# Patient Record
Sex: FEMALE | Race: WHITE | Employment: UNEMPLOYED | URBAN - METROPOLITAN AREA
[De-identification: names, ages, dates, MRNs, and addresses within clinical notes are randomized per-mention and may not be internally consistent; named-entity substitution may affect disease eponyms.]

---

## 2024-01-31 ENCOUNTER — OFFICE VISIT (OUTPATIENT)
Dept: OBGYN CLINIC | Facility: CLINIC | Age: 44
End: 2024-01-31

## 2024-01-31 VITALS
BODY MASS INDEX: 24.29 KG/M2 | HEART RATE: 96 BPM | DIASTOLIC BLOOD PRESSURE: 75 MMHG | HEIGHT: 62 IN | SYSTOLIC BLOOD PRESSURE: 133 MMHG | WEIGHT: 132 LBS

## 2024-01-31 DIAGNOSIS — Z12.31 ENCOUNTER FOR SCREENING MAMMOGRAM FOR MALIGNANT NEOPLASM OF BREAST: ICD-10-CM

## 2024-01-31 DIAGNOSIS — Z01.419 ENCOUNTER FOR ANNUAL ROUTINE GYNECOLOGICAL EXAMINATION: Primary | ICD-10-CM

## 2024-01-31 PROCEDURE — G0145 SCR C/V CYTO,THINLAYER,RESCR: HCPCS | Performed by: NURSE PRACTITIONER

## 2024-01-31 PROCEDURE — G0476 HPV COMBO ASSAY CA SCREEN: HCPCS | Performed by: NURSE PRACTITIONER

## 2024-01-31 PROCEDURE — S0610 ANNUAL GYNECOLOGICAL EXAMINA: HCPCS | Performed by: NURSE PRACTITIONER

## 2024-01-31 NOTE — PATIENT INSTRUCTIONS
PAP results can take up to 2 weeks  Schedule mammogram  You will be call when IUD is available for insertion  Call with needs or concerns  Return in 1 year for annual GYN exam

## 2024-01-31 NOTE — PROGRESS NOTES
Annual Exam    Assessment   1. Encounter for annual routine gynecological examination  Liquid-based pap, screening      2. Encounter for screening mammogram for malignant neoplasm of breast  Mammo screening bilateral w 3d & cad        well woman       Plan       All questions answered.  Breast self exam technique reviewed and patient encouraged to perform self-exam monthly.  Contraception: IUD.  Discussed healthy lifestyle modifications.  Follow up in 1 year.  Mammogram.  Thin prep Pap smear.     Patient Instructions   PAP results can take up to 2 weeks  Schedule mammogram  You will be call when IUD is available for insertion  Call with needs or concerns  Return in 1 year for annual GYN exam    Subjective      Mallory Freitas is a 43 y.o.  female who presents for annual well woman exam. Periods are regular every 28-30 days, lasting 5 days. No intermenstrual bleeding, spotting, or discharge.  1 partner 21 years, denies domestic violence   Last PAP > 3 years ago in [place], pt states all PAP's have been WNL    Depression Screening Follow-up Plan: Patient's depression screening was negative with a PHQ-2 score of 1. Their PHQ-9 score was 8. Clinically patient does not have depression. No treatment is required.      Current contraception: IUD, Paragard since 2013, pt would like a removal and reinsertion, new Paragard was ordered  History of abnormal Pap smear: no  Family history of uterine or ovarian cancer: no  Regular self breast exam: yes  History of abnormal mammogram: N/A  Family history of breast cancer: no  History of abnormal lipids: unknown    Menstrual History:  OB History          2    Para   2    Term   2            AB        Living   2         SAB        IAB        Ectopic        Multiple        Live Births   2                Menarche age: 15  Patient's last menstrual period was 2024 (exact date).       The following portions of the patient's history were reviewed and updated as  "appropriate: allergies, current medications, past family history, past medical history, past social history, past surgical history and problem list.    Review of Systems  Pertinent items are noted in HPI.      Objective      /75   Pulse 96   Ht 5' 2\" (1.575 m)   Wt 59.9 kg (132 lb)   LMP 01/07/2024 (Exact Date)   BMI 24.14 kg/m²     General: alert and oriented, in no acute distress, alert, appears stated age, and cooperative   Heart: NSR   Lungs: clear to auscultation bilaterally, WNL respiratory effort, negative coughj or SOB   Thyroid: Negative masses palpable   Abdomen: soft, non-tender, without masses or organomegaly palpable   Vulva: normal   Vagina: normal mucosa   Cervix: no bleeding following Pap, no cervical motion tenderness, no lesions, and IUD string noted at cervical os   Uterus: normal size, non-tender, normal shape and consistency   Adnexa: normal adnexa   Urethra: normal   Breasts: NT,negative masses palpable, negative discharge, or dimpling, bilateral breast implants             "

## 2024-02-01 LAB
HPV HR 12 DNA CVX QL NAA+PROBE: NEGATIVE
HPV16 DNA CVX QL NAA+PROBE: NEGATIVE
HPV18 DNA CVX QL NAA+PROBE: NEGATIVE

## 2024-02-05 LAB
LAB AP GYN PRIMARY INTERPRETATION: NORMAL
Lab: NORMAL

## 2024-02-22 ENCOUNTER — TELEPHONE (OUTPATIENT)
Dept: OBGYN CLINIC | Facility: CLINIC | Age: 44
End: 2024-02-22

## 2024-03-16 ENCOUNTER — TELEPHONE (OUTPATIENT)
Dept: OTHER | Facility: OTHER | Age: 44
End: 2024-03-16

## 2024-03-16 NOTE — TELEPHONE ENCOUNTER
Patient is awaiting medication that was supposed to be sent to pharmacy prior to her IUD insertion. No meds were sent yet, she is requesting a call back for instructions on her to take meds as well.

## 2024-03-18 ENCOUNTER — TELEPHONE (OUTPATIENT)
Dept: OBGYN CLINIC | Facility: CLINIC | Age: 44
End: 2024-03-18

## 2024-03-18 DIAGNOSIS — Z30.430 ENCOUNTER FOR IUD INSERTION: Primary | ICD-10-CM

## 2024-03-18 RX ORDER — MISOPROSTOL 200 UG/1
TABLET ORAL
Qty: 2 TABLET | Refills: 0 | Status: SHIPPED | OUTPATIENT
Start: 2024-03-18

## 2024-03-18 RX ORDER — MISOPROSTOL 200 UG/1
200 TABLET ORAL 4 TIMES DAILY
Qty: 2 TABLET | Refills: 0 | Status: SHIPPED | OUTPATIENT
Start: 2024-03-18 | End: 2024-03-18 | Stop reason: CLARIF

## 2024-03-18 NOTE — TELEPHONE ENCOUNTER
PT is calling again awaiting the medication.  Please call PT to verify the script was sent to the pharmacy.  [phone]

## 2024-03-19 ENCOUNTER — PROCEDURE VISIT (OUTPATIENT)
Dept: OBGYN CLINIC | Facility: CLINIC | Age: 44
End: 2024-03-19

## 2024-03-19 VITALS
BODY MASS INDEX: 24.55 KG/M2 | DIASTOLIC BLOOD PRESSURE: 83 MMHG | HEIGHT: 62 IN | WEIGHT: 133.4 LBS | SYSTOLIC BLOOD PRESSURE: 140 MMHG | HEART RATE: 105 BPM

## 2024-03-19 DIAGNOSIS — Z30.430 ENCOUNTER FOR IUD INSERTION: Primary | ICD-10-CM

## 2024-03-19 LAB — SL AMB POCT URINE HCG: NEGATIVE

## 2024-03-19 RX ORDER — COPPER 313.4 MG/1
1 INTRAUTERINE DEVICE INTRAUTERINE
OUTPATIENT
Start: 2024-03-19

## 2024-03-19 RX ADMIN — COPPER 1 INTRA UTERINE DEVICE: 313.4 INTRAUTERINE DEVICE INTRAUTERINE at 11:27

## 2024-03-19 NOTE — PROGRESS NOTES
Iud removal    Date/Time: 3/19/2024 9:00 AM    Performed by: Sierra Mcbride MD  Authorized by: Sierra Mcbride MD  Universal Protocol:  Procedure performed by: (Seth Hernandez MD)  Consent: Verbal consent obtained. Written consent obtained.  Consent given by: patient  Patient understanding: patient states understanding of the procedure being performed  Patient consent: the patient's understanding of the procedure matches consent given    Procedure:     Other reason for removal:  Removal indicated 10 years after placement  Comments:      Speculum was inserted in the vagina. Betadine was applied to the cervix. Cytobrush was inserted through cervical os, with confirmation that cervical os was dilated adequately for Paragard removal. Paraguard strings were grasped with ringed forceps and placed on gentle traction for removal. The device was inspected after removal, and it was noted that one of the arms of the Paragard was missing.    Counseling regarding this complication may be found in the visit note.

## 2024-03-19 NOTE — PROGRESS NOTES
Iud insertions    Date/Time: 3/19/2024 9:00 AM    Performed by: Sierra Mcbride MD  Authorized by: Sierra Mcbride MD    Other Assisting Provider: Yes (comment) (Seth Hernandez MD)    Verbal consent obtained?: Yes    Consent given by:  Patient  Patient states understanding of procedure being performed: Yes    Patient's understanding of procedure matches consent: Yes    Procedure:     Pelvic exam performed: yes      Negative urine pregnancy test: yes      Cervix cleaned and prepped: yes      Speculum placed in vagina: yes      Tenaculum applied to cervix: yes      Uterus sounded: yes      Uterus sound depth (cm):  8    IUD inserted with no complications: no      IUD type:  ParaGard    Strings trimmed: yes    Post-procedure:     Patient tolerated procedure well: no    Comments:      After Paragard removal as documented in previous procedure note, a tenaculum was placed on the anterior lip of the cervix. The uterus was sounded to 8cm. The device, insertion tube and stabilizing adrian were assembled as per  instructions. The insertion tube was placed through the cervix and advanced until the flange met the cervix. The insertion tube was withdrawn to the loop of the stabilizing adrian while holding the adrian completely stationary, releasing the IUD arms and deploying the IUD. At this point, the patient stated that she could no longer tolerate the procedure. The insertion tube and stabilizing adrian were immediately removed from the vagina. This occurred before the insertion tube was re-advanced to the cervix; because this step was not completed, it is uncertain if the IUD was appropriately placed at the fundus. The counseling that occurred at this time is described thoroughly in the office visit note.The IUD strings were trimmed to 2cm and the speculum was removed from the vagina.

## 2024-03-19 NOTE — PROGRESS NOTES
OB/GYN VISIT  Mallory Freitas  3/19/2024  4:30 PM    Subjective:     Mallory Freitas is a 43 y.o.  female who presents for removal and insertion of paragard IUD. Consent was signed with the patient for the previously mentioned procedure with risks discussed including bleeding, infection and pain.     After the removal of the 1st Paragard IUD, it was noted that one of the arms had been detached during extraction of the IUD, and was likely still within the uterus. It was discussed with the patient using  Hue Kellogg that the IUD was only partially extracted, with the remaining arm likely embedded in the uterine wall or floating freely within the uterus. It was explained that this is not a contraindication to proceeding with placement of a new IUD at this time. Alternatively, we could stop the procedure at this time, and if she is concerned about the missing arm of the IUD, we could proceed with an examine under anesthesia and hysteroscopy to search for that piece of the IUD. However, there is a risk that the procedure would be futile, as the piece is so small that it might not be possible to find it on hysteroscopy, or it may be expulsed on its own from the uterus if it is not embedded. After this discussion, the patient preferred to proceed with new Paragard placement.    During the IUD placement, the patient had  significant difficulty tolerating the pain of the procedure and requested to stop IUD placement mid-procedure. As discussed in the procedure note, this occurred after the insertion tube was pulled back to release the arms of the IUD and deploy the device, but before the insertion tube was advanced again to ensure that the device is appropriately placed at the fundus.  It was discussed that owing to this timing, though the IUD was placed, it is possibly malpositioned. Given that the IUD may be appropriately placed, and the possibility of causing further pain by removing the newly placed  "Paragard, the patient agreed to the plan to undergo evaluation of IUD placement with TVUS. We discussed that if the IUD is malpositioned on imaging and causing her pain, we can consider exam under anesthesia, removal of Paragard, and placement of new Paragard, given the pain that this placement caused her. The patient was in agreement with this plan. I expressed by sorrow and condolences multiple times, given the complications that occurred with both the removal and placement of Paragard. The patient was appreciate of the apologies and explanation using the .      No past medical history on file.  Past Surgical History:   Procedure Laterality Date    AUGMENTATION BREAST Bilateral      SECTION         Objective:    Vitals: Blood pressure 140/83, pulse 105, height 5' 2\" (1.575 m), weight 60.5 kg (133 lb 6.4 oz), last menstrual period 2024.Body mass index is 24.4 kg/m².      Physical Exam:  GEN: The patient was alert and oriented x3, pleasant well-appearing female in no acute distress.   CV:  Regular rate   RESP:  Unlabored breathing  GI:  Soft, nontender, non-distended  MSK: bilateral lower extremities are nontender, no edema  : Normal appearing external female genitalia, normal appearing urethral meatus. On sterile speculum exam,  normal appearing vaginal epithelium, no vaginal discharge, no bleeding, grossly normal appearing cervix.       Assessment/Plan:  Mallory Freitas is a 42 y/o female presenting for removal and insertion of IUD, with significant pain with both removal and insertion. There were complications with both removal and insertion of the IUD as stated in detail in the procedure note and subjective above. Given retention of the arm of the IUD that was removed, in addition to possible malpositioning of the new IUD, transvaginal ultrasound was ordered for evaluation.   Problem List Items Addressed This Visit    None  Visit Diagnoses       Encounter for IUD insertion    -  Primary "    Relevant Medications    intrauterine copper (PARAGARD) IUD    Other Relevant Orders    US pelvis complete w transvaginal    POCT urine HCG (Completed)            D/w Dr. Mary Mcbride MD  3/19/2024  4:30 PM    Please note, all notes within the [facility] are written in medical terminology for other doctors to read. If you have a question about something you see in your chart, please don't hesitate to ask about it at your next appointment. All test results are immediately available through [facility] as well, and I will review results at my earliest opportunity and contact you with the appropriate urgency.

## 2024-03-25 ENCOUNTER — HOSPITAL ENCOUNTER (OUTPATIENT)
Dept: ULTRASOUND IMAGING | Facility: HOSPITAL | Age: 44
Discharge: HOME | End: 2024-03-25
Payer: COMMERCIAL

## 2024-03-25 ENCOUNTER — TELEPHONE (OUTPATIENT)
Dept: OBGYN CLINIC | Facility: CLINIC | Age: 44
End: 2024-03-25

## 2024-03-25 DIAGNOSIS — Z30.430 ENCOUNTER FOR IUD INSERTION: ICD-10-CM

## 2024-03-25 PROCEDURE — 76856 US EXAM PELVIC COMPLETE: CPT

## 2024-03-25 PROCEDURE — 76830 TRANSVAGINAL US NON-OB: CPT

## 2024-03-25 NOTE — TELEPHONE ENCOUNTER
Called patient with  [phone] to discuss the result of her transvaginal ultrasound, which demonstrates a malpositioned IUD in the lower segment of her uterus. The patient is not experiencing any abdominal pain since the day that the IUD was placed. We discussed that as long as it is not bothering the patient, we can keep the IUD as it is without replacing it. She may contact the clinic with any questions or concerns, or with any new abdominal pain. She will still come to the clinic on 4/9/24 for a string check.    Sierra OJEDAN PGY2

## 2024-04-06 NOTE — PROGRESS NOTES
"OB/GYN VISIT  Mallory Freitas  2024  3:17 PM    Subjective:     Mallory Freitas is a 43 y.o.  female who presents for IUD string check. We addressed again that though the IUD is in the lower segment of the uterus, as long as it is present it will still function adequately to prevent pregnancy. We discussed that she may be at increased risk of the IUD being displaced and getting ejected from her uterus given its position, Her options include IUD removal and replacement with new IUD, or other form of contraception. She may also have IUD replaced under anesthesia, though it is uncertain if this would be covered by insurance. Because the strings are still in place, the patient is amenable to interval string check.      History reviewed. No pertinent past medical history.  Past Surgical History:   Procedure Laterality Date    AUGMENTATION BREAST Bilateral      SECTION         Objective:    Vitals: Blood pressure 144/90, pulse (!) 111, height 5' 2\" (1.575 m), weight 60.1 kg (132 lb 9.6 oz), last menstrual period 2024.Body mass index is 24.25 kg/m².      Physical Exam:  GEN: The patient was alert and oriented x3, pleasant well-appearing female in no acute distress.   CV:  Regular rate   RESP:  Unlabored breathing  : Normal appearing external female genitalia, normal appearing urethral meatus. On sterile speculum exam,  normal appearing vaginal epithelium, no vaginal discharge, no bleeding, grossly normal appearing cervix. Strings of cervix identified    Assessment/Plan:  Mallory Freitas is a 42 y/o female presenting for IUD string check for malpositioned IUD. The patient is amenable to maintaining copper IUD in place, with string check planned again in 6 months.  Problem List Items Addressed This Visit    None  Visit Diagnoses       Positive depression screening    -  Primary    Relevant Orders    Ambulatory Referral to Social Work Care Management Program          Depression Screening Follow-up " Plan: Patient's depression screening was positive with a PHQ-2 score of 1. Their PHQ-9 score was 10. Social work to reach out to patient.    D/w Dr. Mary Mcbride MD  4/9/2024  3:17 PM

## 2024-04-09 ENCOUNTER — OFFICE VISIT (OUTPATIENT)
Dept: OBGYN CLINIC | Facility: CLINIC | Age: 44
End: 2024-04-09

## 2024-04-09 VITALS
DIASTOLIC BLOOD PRESSURE: 90 MMHG | WEIGHT: 132.6 LBS | BODY MASS INDEX: 24.4 KG/M2 | SYSTOLIC BLOOD PRESSURE: 144 MMHG | HEART RATE: 111 BPM | HEIGHT: 62 IN

## 2024-04-09 DIAGNOSIS — Z13.31 POSITIVE DEPRESSION SCREENING: Primary | ICD-10-CM

## 2024-04-16 ENCOUNTER — PATIENT OUTREACH (OUTPATIENT)
Dept: OBGYN CLINIC | Facility: CLINIC | Age: 44
End: 2024-04-16

## 2024-04-16 NOTE — PROGRESS NOTES
STEPHANIE REILLY was referred to complete a SOC psych assessment to address PT needs with  service ID [id number]. PT is a 43 y.o.  female .     PT reports that she is currently at work. STEPHANIE RIELLY will f/u with pt at another time.

## 2024-04-18 ENCOUNTER — PATIENT OUTREACH (OUTPATIENT)
Dept: OBGYN CLINIC | Facility: CLINIC | Age: 44
End: 2024-04-18

## 2024-04-18 NOTE — LETTER
04/18/24    Estimado/a Abran Cronin un coordinador de la atención médica en [facility]  [facility]  [address] [address]  [facility] [address] [phone]. Intentamos comunicarnos con usted por teléfono varias veces. Es importante que se comunique con nosotros al [phone] para que podamos ofrecerle ayuda con james necesidades de atención médica.     Atentamente.         Lexus Avery  [employer]

## 2024-04-18 NOTE — PROGRESS NOTES
STEPHANIE REILLY attempted to contact Pt with  service ID [id number]. Pt did not answer. STEPHANIE REILLY left a voicemail for a return call. STEPHANIE REILLY sent pt unable to reach letter. STEPHANIE REILLY will otherwise close this referral at this time.

## 2024-06-26 PROBLEM — Z71.2 ENCOUNTER TO DISCUSS TEST RESULTS: Status: ACTIVE | Noted: 2024-06-26

## 2024-08-13 ENCOUNTER — PATIENT OUTREACH (OUTPATIENT)
Dept: OBGYN CLINIC | Facility: CLINIC | Age: 44
End: 2024-08-13

## 2024-08-13 NOTE — PROGRESS NOTES
STEPHANIE REILLY received a call from pt. Pt reports that she was contacted by STEPHANIE REILLY and she has depression. STEPHANIE REILLY used  service ID [id number]. STEPHANIE REILLY reminded pt that she contacted her in April to assist with depression. Pt was not cooperative at [facility] time for assistance. T is currently at work and reports difficulty in talking. STEPHANIE REILLY informed pt that with private insurance she can contact her insurance to find mental health support and she should be able to contact them after work. Pt reports that she will do that. STEPHANIE REILLY encouraged pt to schedule an appointment with her PCP to discuss depression if she needs additional support regarding medication or scheduling for a therapist.

## 2024-09-18 ENCOUNTER — TELEPHONE (OUTPATIENT)
Dept: OBGYN CLINIC | Facility: CLINIC | Age: 44
End: 2024-09-18

## 2025-02-03 ENCOUNTER — TELEPHONE (OUTPATIENT)
Dept: OBGYN CLINIC | Facility: CLINIC | Age: 45
End: 2025-02-03

## 2025-02-03 NOTE — TELEPHONE ENCOUNTER
Voicemail:Hi my phone number is [phone] I need to change my appointment I have 2/5/25.    Patient was called and appt has been rescheduled to 2/17/25.

## 2025-03-11 ENCOUNTER — ANNUAL EXAM (OUTPATIENT)
Dept: OBGYN CLINIC | Facility: CLINIC | Age: 45
End: 2025-03-11

## 2025-03-11 VITALS
HEIGHT: 62 IN | BODY MASS INDEX: 24.29 KG/M2 | DIASTOLIC BLOOD PRESSURE: 76 MMHG | SYSTOLIC BLOOD PRESSURE: 126 MMHG | HEART RATE: 78 BPM | WEIGHT: 132 LBS

## 2025-03-11 DIAGNOSIS — Z01.419 ENCOUNTER FOR ANNUAL ROUTINE GYNECOLOGICAL EXAMINATION: Primary | ICD-10-CM

## 2025-03-11 DIAGNOSIS — Z12.31 ENCOUNTER FOR SCREENING MAMMOGRAM FOR MALIGNANT NEOPLASM OF BREAST: ICD-10-CM

## 2025-03-11 PROCEDURE — S0612 ANNUAL GYNECOLOGICAL EXAMINA: HCPCS | Performed by: NURSE PRACTITIONER

## 2025-03-11 RX ORDER — FLUOXETINE HYDROCHLORIDE 40 MG/1
1 CAPSULE ORAL DAILY
COMMUNITY
Start: 2025-02-27

## 2025-03-11 RX ORDER — QUETIAPINE FUMARATE 25 MG/1
25 TABLET, FILM COATED ORAL
COMMUNITY
Start: 2025-02-27

## 2025-03-11 NOTE — PROGRESS NOTES
Annual Exam    Assessment   1. Encounter for annual routine gynecological examination        2. Encounter for screening mammogram for malignant neoplasm of breast  Mammo screening bilateral w 3d and cad        well woman       Plan       Await pap smear results.  Breast self exam technique reviewed and patient encouraged to perform self-exam monthly.  Contraception: IUD.  Discussed healthy lifestyle modifications.  Follow up in 1 year.  Mammogram.     Patient Instructions   Schedule mammogram  Call wit needs or concerns  Return in 1 year for annual GYN exam   Pt verbalized understanding of all discussed.      Subjective      Mallory Freitas is a 44 y.o.  female who presents for annual well woman exam. Periods are regular every 28-30 days, lasting 5 days. No intermenstrual bleeding, spotting, or discharge.  2024 WNL PAP and negative HPV  No mammogram on file  1 partner x 22 years, denies domesyic violence  Never had HPV vaccines, benefits were discussed, pt declined today, states she will reasearch the vaccine and call if she would like to start the series      Current contraception: IUD  History of abnormal Pap smear: HPV positive 15 years ago  Family history of uterine or ovarian cancer: no  Regular self breast exam: yes  History of abnormal mammogram: none on file  Family history of breast cancer: no  History of abnormal lipids: unknown  Menstrual History:  OB History          2    Para   2    Term   2            AB        Living   2         SAB        IAB        Ectopic        Multiple        Live Births   2                Menarche age: 14  No LMP recorded.       The following portions of the patient's history were reviewed and updated as appropriate: allergies, current medications, past family history, past medical history, past social history, past surgical history and problem list.    Review of Systems  Pertinent items are noted in HPI.      Objective      There were no vitals taken  for this visit.    General: alert and oriented, in no acute distress, alert, appears stated age, and cooperative   Heart: NSR   Lungs: clear to auscultation bilaterally, WNL respiratory effort, negative cough or SOB   Thyroid: Negative masses palpable   Abdomen: soft, non-tender, without masses or organomegaly palpable   Vulva: normal   Vagina: normal mucosa   Cervix: no cervical motion tenderness and no lesions   Uterus: normal size, non-tender, normal shape and consistency   Adnexa: normal adnexa   Urethra: normal   Breasts: NT,negative masses palpable, negative scharge, or dimpling, bilateral breast augmentation

## 2025-05-12 ENCOUNTER — CLINICAL SUPPORT (OUTPATIENT)
Dept: OBGYN CLINIC | Facility: CLINIC | Age: 45
End: 2025-05-12

## 2025-05-12 VITALS — DIASTOLIC BLOOD PRESSURE: 82 MMHG | WEIGHT: 132.6 LBS | BODY MASS INDEX: 24.25 KG/M2 | SYSTOLIC BLOOD PRESSURE: 118 MMHG

## 2025-05-12 DIAGNOSIS — Z23 NEED FOR HPV VACCINATION: Primary | ICD-10-CM

## 2025-05-12 NOTE — PROGRESS NOTES
Pt here today for 1st Hpv vaccine given in the left deltoid          NDC#[id number]  LoT#[id number]  EXP#1/28/2027   yes

## 2025-07-21 ENCOUNTER — CLINICAL SUPPORT (OUTPATIENT)
Dept: OBGYN CLINIC | Facility: CLINIC | Age: 45
End: 2025-07-21

## 2025-07-21 VITALS — DIASTOLIC BLOOD PRESSURE: 70 MMHG | SYSTOLIC BLOOD PRESSURE: 118 MMHG | WEIGHT: 135.2 LBS | BODY MASS INDEX: 24.73 KG/M2

## 2025-07-21 DIAGNOSIS — Z23 NEED FOR HPV VACCINE: Primary | ICD-10-CM

## 2025-07-21 PROCEDURE — 90651 9VHPV VACCINE 2/3 DOSE IM: CPT

## 2025-07-21 PROCEDURE — 90471 IMMUNIZATION ADMIN: CPT
